# Patient Record
Sex: FEMALE
[De-identification: names, ages, dates, MRNs, and addresses within clinical notes are randomized per-mention and may not be internally consistent; named-entity substitution may affect disease eponyms.]

---

## 2023-08-07 PROBLEM — Z00.00 ENCOUNTER FOR PREVENTIVE HEALTH EXAMINATION: Status: ACTIVE | Noted: 2023-08-07

## 2023-08-22 ENCOUNTER — APPOINTMENT (OUTPATIENT)
Dept: NEUROSURGERY | Facility: CLINIC | Age: 63
End: 2023-08-22
Payer: SELF-PAY

## 2023-08-22 DIAGNOSIS — H93.A9 PULSATILE TINNITUS, UNSPECIFIED EAR: ICD-10-CM

## 2023-08-22 PROCEDURE — EDU01: CPT

## 2023-08-22 NOTE — ASSESSMENT
[FreeTextEntry1] : Impression: RIGHT Pulsatile Tinnitus Improves with ipsilateral neck pressure  We discussed possible causes of pulsatile tinnitus including: ENT causes such as semicircular canal dehiscence, tumor, ototoxicity Cardiac causes such as aortic stenosis, valve disease, HTN, other causes of heart murmur Anemia Thyroid disease Cerebrovascular causes such as arteriovenous malformation (AVM), dural arteriovenous fistula (dAVF), venous sinus stenosis, venous aneurysm, large trans-mastoid emissary vein, carotid stenosis, jugular bulb diverticulum   CTA/MRI 2009 reveals small old stroke; no evidence of large aneurysm, AVM or venous sinus stenosis MRI/MRA 2023 reveals no dural AVF or AVM; mild right carotid artery stenosis; no evidence of venous sinus stenosis  I reassured Ms. Humphrey that there is no underlying cerebrovascular cause of pulsatile tinnitus on her prior good quality imaging.  I think further evaluation with catheter-based procedures would be very low yield.  I recommend a second opinion with neuro-otology.   Recommendations: Evaluation by Neuro-Otology Follow Up with Me As Needed

## 2023-08-22 NOTE — HISTORY OF PRESENT ILLNESS
[de-identified] : Ms. FORTUNE is a pleasant 62 year old female who presents today with a chief complaint of RIGHT ear pulsatile tinnitus.  It first started in 9/2022.  Since that time it has been getting progressively worse.  It is described as a constant, whooshing sound that is in sync with her pulse.  There are no facial or jaw movements that change the sound.  There is no change being upright or laying down.    She has a history of BPPV and bilateral non-pulsatile tinnitus.

## 2023-08-22 NOTE — REASON FOR VISIT
[Home] : at home, [unfilled] , at the time of the educational consult. [Medical Office: ([facility])___] : at the medical office located in  [Participant] : the participant [Self] : self [New Patient Visit] : a new patient visit [FreeTextEntry1] : Pulsatile Tinnitus